# Patient Record
Sex: MALE | Race: WHITE | NOT HISPANIC OR LATINO | Employment: OTHER | URBAN - NONMETROPOLITAN AREA
[De-identification: names, ages, dates, MRNs, and addresses within clinical notes are randomized per-mention and may not be internally consistent; named-entity substitution may affect disease eponyms.]

---

## 2022-09-23 ENCOUNTER — HOSPITAL ENCOUNTER (OUTPATIENT)
Facility: HOSPITAL | Age: 57
Setting detail: OBSERVATION
Discharge: HOME | End: 2022-09-24
Attending: EMERGENCY MEDICINE | Admitting: FAMILY MEDICINE
Payer: COMMERCIAL

## 2022-09-23 DIAGNOSIS — R26.9 GAIT DIFFICULTY: ICD-10-CM

## 2022-09-23 DIAGNOSIS — M54.50 ACUTE RIGHT-SIDED LOW BACK PAIN WITHOUT SCIATICA: Primary | ICD-10-CM

## 2022-09-23 LAB
ALBUMIN SERPL BCP-MCNC: 4 G/DL (ref 3.5–5)
ALP SERPL-CCNC: 41 U/L (ref 46–116)
ALT SERPL W P-5'-P-CCNC: 66 U/L (ref 12–78)
ANION GAP SERPL CALCULATED.3IONS-SCNC: 6 MMOL/L (ref 4–13)
AST SERPL W P-5'-P-CCNC: 32 U/L (ref 5–45)
BASOPHILS # BLD AUTO: 0.01 THOUSANDS/ΜL (ref 0–0.1)
BASOPHILS NFR BLD AUTO: 0 % (ref 0–1)
BILIRUB SERPL-MCNC: 0.81 MG/DL (ref 0.2–1)
BUN SERPL-MCNC: 13 MG/DL (ref 5–25)
CALCIUM SERPL-MCNC: 8.6 MG/DL (ref 8.3–10.1)
CHLORIDE SERPL-SCNC: 103 MMOL/L (ref 96–108)
CO2 SERPL-SCNC: 28 MMOL/L (ref 21–32)
CREAT SERPL-MCNC: 0.81 MG/DL (ref 0.6–1.3)
EOSINOPHIL # BLD AUTO: 0.01 THOUSAND/ΜL (ref 0–0.61)
EOSINOPHIL NFR BLD AUTO: 0 % (ref 0–6)
ERYTHROCYTE [DISTWIDTH] IN BLOOD BY AUTOMATED COUNT: 12.7 % (ref 11.6–15.1)
GFR SERPL CREATININE-BSD FRML MDRD: 98 ML/MIN/1.73SQ M
GLUCOSE SERPL-MCNC: 109 MG/DL (ref 65–140)
HCT VFR BLD AUTO: 45.7 % (ref 36.5–49.3)
HGB BLD-MCNC: 15.9 G/DL (ref 12–17)
IMM GRANULOCYTES # BLD AUTO: 0.09 THOUSAND/UL (ref 0–0.2)
IMM GRANULOCYTES NFR BLD AUTO: 1 % (ref 0–2)
LYMPHOCYTES # BLD AUTO: 0.89 THOUSANDS/ΜL (ref 0.6–4.47)
LYMPHOCYTES NFR BLD AUTO: 7 % (ref 14–44)
MCH RBC QN AUTO: 33.4 PG (ref 26.8–34.3)
MCHC RBC AUTO-ENTMCNC: 34.8 G/DL (ref 31.4–37.4)
MCV RBC AUTO: 96 FL (ref 82–98)
MONOCYTES # BLD AUTO: 0.74 THOUSAND/ΜL (ref 0.17–1.22)
MONOCYTES NFR BLD AUTO: 6 % (ref 4–12)
NEUTROPHILS # BLD AUTO: 10.28 THOUSANDS/ΜL (ref 1.85–7.62)
NEUTS SEG NFR BLD AUTO: 86 % (ref 43–75)
NRBC BLD AUTO-RTO: 0 /100 WBCS
PLATELET # BLD AUTO: 147 THOUSANDS/UL (ref 149–390)
PMV BLD AUTO: 10.2 FL (ref 8.9–12.7)
POTASSIUM SERPL-SCNC: 4 MMOL/L (ref 3.5–5.3)
PROT SERPL-MCNC: 6.7 G/DL (ref 6.4–8.4)
RBC # BLD AUTO: 4.76 MILLION/UL (ref 3.88–5.62)
SODIUM SERPL-SCNC: 137 MMOL/L (ref 135–147)
WBC # BLD AUTO: 12.02 THOUSAND/UL (ref 4.31–10.16)

## 2022-09-23 PROCEDURE — 85025 COMPLETE CBC W/AUTO DIFF WBC: CPT | Performed by: EMERGENCY MEDICINE

## 2022-09-23 PROCEDURE — 80053 COMPREHEN METABOLIC PANEL: CPT | Performed by: EMERGENCY MEDICINE

## 2022-09-23 PROCEDURE — 96375 TX/PRO/DX INJ NEW DRUG ADDON: CPT

## 2022-09-23 PROCEDURE — 36415 COLL VENOUS BLD VENIPUNCTURE: CPT | Performed by: EMERGENCY MEDICINE

## 2022-09-23 PROCEDURE — 99285 EMERGENCY DEPT VISIT HI MDM: CPT

## 2022-09-23 PROCEDURE — 96374 THER/PROPH/DIAG INJ IV PUSH: CPT

## 2022-09-23 PROCEDURE — 99285 EMERGENCY DEPT VISIT HI MDM: CPT | Performed by: EMERGENCY MEDICINE

## 2022-09-23 RX ORDER — LIDOCAINE 50 MG/G
1 PATCH TOPICAL ONCE
Status: COMPLETED | OUTPATIENT
Start: 2022-09-23 | End: 2022-09-24

## 2022-09-23 RX ORDER — MORPHINE SULFATE 10 MG/ML
6 INJECTION, SOLUTION INTRAMUSCULAR; INTRAVENOUS ONCE
Status: COMPLETED | OUTPATIENT
Start: 2022-09-23 | End: 2022-09-23

## 2022-09-23 RX ORDER — METHOCARBAMOL 500 MG/1
1000 TABLET, FILM COATED ORAL ONCE
Status: COMPLETED | OUTPATIENT
Start: 2022-09-23 | End: 2022-09-23

## 2022-09-23 RX ORDER — FENTANYL CITRATE 50 UG/ML
100 INJECTION, SOLUTION INTRAMUSCULAR; INTRAVENOUS ONCE
Status: COMPLETED | OUTPATIENT
Start: 2022-09-23 | End: 2022-09-23

## 2022-09-23 RX ADMIN — MORPHINE SULFATE 6 MG: 10 INJECTION, SOLUTION INTRAMUSCULAR; INTRAVENOUS at 22:28

## 2022-09-23 RX ADMIN — FENTANYL CITRATE 100 MCG: 50 INJECTION INTRAMUSCULAR; INTRAVENOUS at 23:43

## 2022-09-23 RX ADMIN — LIDOCAINE 5% 1 PATCH: 700 PATCH TOPICAL at 22:29

## 2022-09-23 RX ADMIN — METHOCARBAMOL TABLETS 1000 MG: 500 TABLET, COATED ORAL at 22:28

## 2022-09-24 VITALS
DIASTOLIC BLOOD PRESSURE: 79 MMHG | HEART RATE: 58 BPM | OXYGEN SATURATION: 95 % | RESPIRATION RATE: 18 BRPM | TEMPERATURE: 98.4 F | SYSTOLIC BLOOD PRESSURE: 140 MMHG | HEIGHT: 66 IN | BODY MASS INDEX: 27.77 KG/M2 | WEIGHT: 172.8 LBS

## 2022-09-24 PROBLEM — I25.10 CAD (CORONARY ARTERY DISEASE): Status: ACTIVE | Noted: 2022-09-24

## 2022-09-24 LAB
BACTERIA UR QL AUTO: ABNORMAL /HPF
BILIRUB UR QL STRIP: NEGATIVE
CLARITY UR: ABNORMAL
COLOR UR: YELLOW
GLUCOSE UR STRIP-MCNC: NEGATIVE MG/DL
HGB UR QL STRIP.AUTO: ABNORMAL
KETONES UR STRIP-MCNC: ABNORMAL MG/DL
LEUKOCYTE ESTERASE UR QL STRIP: NEGATIVE
NITRITE UR QL STRIP: NEGATIVE
NON-SQ EPI CELLS URNS QL MICRO: ABNORMAL /HPF
PH UR STRIP.AUTO: 6.5 [PH]
PROT UR STRIP-MCNC: NEGATIVE MG/DL
RBC #/AREA URNS AUTO: ABNORMAL /HPF
SP GR UR STRIP.AUTO: 1.02 (ref 1–1.03)
UROBILINOGEN UR QL STRIP.AUTO: 0.2 E.U./DL
WBC #/AREA URNS AUTO: ABNORMAL /HPF

## 2022-09-24 PROCEDURE — 99236 HOSP IP/OBS SAME DATE HI 85: CPT | Performed by: FAMILY MEDICINE

## 2022-09-24 PROCEDURE — 81001 URINALYSIS AUTO W/SCOPE: CPT | Performed by: EMERGENCY MEDICINE

## 2022-09-24 RX ORDER — LISINOPRIL 10 MG/1
10 TABLET ORAL 2 TIMES DAILY
COMMUNITY

## 2022-09-24 RX ORDER — CLOPIDOGREL BISULFATE 75 MG/1
75 TABLET ORAL DAILY
Status: DISCONTINUED | OUTPATIENT
Start: 2022-09-24 | End: 2022-09-24 | Stop reason: HOSPADM

## 2022-09-24 RX ORDER — CLOPIDOGREL BISULFATE 75 MG/1
75 TABLET ORAL DAILY
COMMUNITY

## 2022-09-24 RX ORDER — ROSUVASTATIN CALCIUM 20 MG/1
20 TABLET, COATED ORAL DAILY
COMMUNITY

## 2022-09-24 RX ORDER — ATORVASTATIN CALCIUM 40 MG/1
40 TABLET, FILM COATED ORAL
Status: DISCONTINUED | OUTPATIENT
Start: 2022-09-24 | End: 2022-09-24 | Stop reason: HOSPADM

## 2022-09-24 RX ORDER — METHOCARBAMOL 500 MG/1
500 TABLET, FILM COATED ORAL 3 TIMES DAILY
Qty: 30 TABLET | Refills: 0 | Status: SHIPPED | OUTPATIENT
Start: 2022-09-24 | End: 2022-10-04

## 2022-09-24 RX ORDER — LANOLIN ALCOHOL/MO/W.PET/CERES
3 CREAM (GRAM) TOPICAL
Qty: 30 TABLET | Refills: 0 | Status: SHIPPED | OUTPATIENT
Start: 2022-09-24 | End: 2022-10-07

## 2022-09-24 RX ORDER — LISINOPRIL 10 MG/1
10 TABLET ORAL 2 TIMES DAILY
Status: DISCONTINUED | OUTPATIENT
Start: 2022-09-24 | End: 2022-09-24 | Stop reason: HOSPADM

## 2022-09-24 RX ORDER — PREDNISONE 20 MG/1
40 TABLET ORAL DAILY
Qty: 10 TABLET | Refills: 0 | Status: SHIPPED | OUTPATIENT
Start: 2022-09-25 | End: 2022-09-30

## 2022-09-24 RX ORDER — ACETAMINOPHEN 325 MG/1
650 TABLET ORAL
Qty: 90 TABLET | Refills: 0 | Status: SHIPPED | OUTPATIENT
Start: 2022-09-24 | End: 2022-10-07

## 2022-09-24 RX ORDER — PRAVASTATIN SODIUM 20 MG
20 TABLET ORAL DAILY
COMMUNITY
End: 2022-09-24

## 2022-09-24 RX ORDER — ASPIRIN 81 MG/1
81 TABLET ORAL DAILY
Status: DISCONTINUED | OUTPATIENT
Start: 2022-09-24 | End: 2022-09-24 | Stop reason: HOSPADM

## 2022-09-24 RX ORDER — OXYCODONE HYDROCHLORIDE 5 MG/1
5 TABLET ORAL EVERY 4 HOURS PRN
Status: DISCONTINUED | OUTPATIENT
Start: 2022-09-24 | End: 2022-09-24 | Stop reason: HOSPADM

## 2022-09-24 RX ORDER — ASPIRIN 81 MG/1
81 TABLET ORAL DAILY
COMMUNITY

## 2022-09-24 RX ORDER — PREDNISONE 20 MG/1
40 TABLET ORAL DAILY
Status: DISCONTINUED | OUTPATIENT
Start: 2022-09-24 | End: 2022-09-24 | Stop reason: HOSPADM

## 2022-09-24 RX ORDER — LANOLIN ALCOHOL/MO/W.PET/CERES
3 CREAM (GRAM) TOPICAL
Status: DISCONTINUED | OUTPATIENT
Start: 2022-09-24 | End: 2022-09-24 | Stop reason: HOSPADM

## 2022-09-24 RX ORDER — EZETIMIBE 10 MG/1
10 TABLET ORAL
COMMUNITY

## 2022-09-24 RX ORDER — EZETIMIBE 10 MG/1
10 TABLET ORAL
Status: DISCONTINUED | OUTPATIENT
Start: 2022-09-24 | End: 2022-09-24 | Stop reason: HOSPADM

## 2022-09-24 RX ORDER — PRAVASTATIN SODIUM 20 MG
20 TABLET ORAL DAILY
Status: DISCONTINUED | OUTPATIENT
Start: 2022-09-24 | End: 2022-09-24

## 2022-09-24 RX ORDER — ACETAMINOPHEN 325 MG/1
650 TABLET ORAL EVERY 6 HOURS PRN
Status: DISCONTINUED | OUTPATIENT
Start: 2022-09-24 | End: 2022-09-24 | Stop reason: HOSPADM

## 2022-09-24 RX ORDER — METHOCARBAMOL 500 MG/1
500 TABLET, FILM COATED ORAL EVERY 6 HOURS PRN
Status: DISCONTINUED | OUTPATIENT
Start: 2022-09-24 | End: 2022-09-24 | Stop reason: HOSPADM

## 2022-09-24 RX ADMIN — CLOPIDOGREL BISULFATE 75 MG: 75 TABLET ORAL at 08:55

## 2022-09-24 RX ADMIN — LISINOPRIL 10 MG: 10 TABLET ORAL at 08:55

## 2022-09-24 RX ADMIN — RIVAROXABAN 2.5 MG: 2.5 TABLET, FILM COATED ORAL at 08:55

## 2022-09-24 RX ADMIN — OXYCODONE HYDROCHLORIDE 5 MG: 5 TABLET ORAL at 01:39

## 2022-09-24 RX ADMIN — METHOCARBAMOL TABLETS 500 MG: 500 TABLET, COATED ORAL at 08:55

## 2022-09-24 RX ADMIN — PREDNISONE 40 MG: 20 TABLET ORAL at 10:27

## 2022-09-24 RX ADMIN — ASPIRIN 81 MG: 81 TABLET, COATED ORAL at 08:55

## 2022-09-24 RX ADMIN — ACETAMINOPHEN 650 MG: 325 TABLET ORAL at 08:54

## 2022-09-24 NOTE — ED PROVIDER NOTES
History  Chief Complaint   Patient presents with    Back Pain     Back pain seen this AM at another ER, pain continues, denies other complaints to triage RN  100 mcg of fent and 4mg of zofran by EMS       History provided by:  Medical records and patient  Back Pain  Location:  Lumbar spine  Quality:  Aching and stiffness  Stiffness is present:  All day  Radiates to: Right anterior thigh  Pain severity:  Severe  Pain is: Worse during the night  Onset quality:  Gradual  Duration:  6 weeks  Timing:  Constant  Progression:  Waxing and waning  Chronicity:  Chronic  Context comment:  Patient states he strained his back 6 weeks ago after lifting crab traps, has been seen a chiropractor weekly since, no significant relief, intensified today to the point reabsorbed having trouble walking, seen at [facility] ED, had CT lumbar spine  Relieved by:  NSAIDs and narcotics (States he got some relief with Toradol, however returned shortly after  Given fentanyl by EMS, states he had some relief, however is returning)  Worsened by:  Sitting, standing and ambulation  Ineffective treatments:  Muscle relaxants  Associated symptoms: no abdominal pain, no chest pain, no dysuria, no fever, no headaches and no weakness        Prior to Admission Medications   Prescriptions Last Dose Informant Patient Reported?  Taking?   aspirin (ECOTRIN LOW STRENGTH) 81 mg EC tablet 9/23/2022 at Unknown time Self Yes Yes   Sig: Take 81 mg by mouth daily   clopidogrel (PLAVIX) 75 mg tablet 9/23/2022 at Unknown time Self Yes Yes   Sig: Take 75 mg by mouth daily   ezetimibe (ZETIA) 10 mg tablet 9/23/2022 at Unknown time Self Yes Yes   Sig: Take 10 mg by mouth 2 (two) times a day   lisinopril (ZESTRIL) 10 mg tablet 9/23/2022 at Unknown time Self Yes Yes   Sig: Take 10 mg by mouth daily   pravastatin (PRAVACHOL) 20 mg tablet 9/23/2022 at Unknown time  Yes Yes   Sig: Take 20 mg by mouth daily      Facility-Administered Medications: None       Past Medical History:   Diagnosis Date    Hyperlipidemia     Hypertension     Myocardial infarct [facility])        Past Surgical History:   Procedure Laterality Date    CARDIAC SURGERY      5 way heart bypass 2016       History reviewed  No pertinent family history  I have reviewed and agree with the history as documented  E-Cigarette/Vaping     E-Cigarette/Vaping Substances     Social History     Tobacco Use    Smoking status: Never Smoker    Smokeless tobacco: Never Used   Substance Use Topics    Alcohol use: Not Currently    Drug use: Not Currently       Review of Systems   Constitutional: Negative for appetite change, chills, fatigue and fever  HENT: Negative for ear pain, rhinorrhea, sore throat and trouble swallowing  Eyes: Negative for pain, discharge and visual disturbance  Respiratory: Negative for cough, chest tightness and shortness of breath  Cardiovascular: Negative for chest pain and palpitations  Gastrointestinal: Negative for abdominal pain, nausea and vomiting  Endocrine: Negative for polydipsia, polyphagia and polyuria  Genitourinary: Negative for difficulty urinating, dysuria, hematuria and testicular pain  Musculoskeletal: Positive for back pain  Negative for arthralgias  Skin: Negative for color change and rash  Allergic/Immunologic: Negative for immunocompromised state  Neurological: Negative for dizziness, seizures, syncope, weakness and headaches  Hematological: Negative for adenopathy  Psychiatric/Behavioral: Negative for confusion and dysphoric mood  All other systems reviewed and are negative  Physical Exam  Physical Exam  Vitals and nursing note reviewed  Constitutional:       General: He is not in acute distress  Appearance: Normal appearance  He is not ill-appearing, toxic-appearing or diaphoretic  HENT:      Head: Normocephalic and atraumatic  Nose: Nose normal  No congestion or rhinorrhea        Mouth/Throat:      Mouth: Mucous membranes are moist       Pharynx: Oropharynx is clear  No oropharyngeal exudate or posterior oropharyngeal erythema  Eyes:      General:         Right eye: No discharge  Left eye: No discharge  Cardiovascular:      Rate and Rhythm: Normal rate and regular rhythm  Pulses: Normal pulses  Heart sounds: Normal heart sounds  No murmur heard  No gallop  Pulmonary:      Effort: Pulmonary effort is normal  No respiratory distress  Breath sounds: Normal breath sounds  No stridor  No wheezing, rhonchi or rales  Chest:      Chest wall: No tenderness  Abdominal:      General: Bowel sounds are normal  There is no distension  Palpations: Abdomen is soft  There is no mass  Tenderness: There is no abdominal tenderness  There is no right CVA tenderness, left CVA tenderness, guarding or rebound  Hernia: No hernia is present  Musculoskeletal:         General: No swelling, tenderness, deformity or signs of injury  Cervical back: Normal range of motion and neck supple  Right lower leg: No edema  Left lower leg: No edema  Comments: Stiffness, bilateral paraspinal spasms lumbar region, reproduction of pain coming to a sitting position and with right straight leg raise, however no radiation past knee   Skin:     General: Skin is warm and dry  Capillary Refill: Capillary refill takes less than 2 seconds  Neurological:      General: No focal deficit present  Mental Status: He is alert and oriented to person, place, and time  Cranial Nerves: No cranial nerve deficit  Sensory: No sensory deficit  Motor: No weakness  Coordination: Coordination normal       Gait: Gait normal       Deep Tendon Reflexes: Reflexes normal    Psychiatric:         Mood and Affect: Mood normal          Behavior: Behavior normal          Thought Content:  Thought content normal          Judgment: Judgment normal          Vital Signs  ED Triage Vitals   Temperature Pulse Respirations Blood Pressure SpO2   09/23/22 2151 09/23/22 2151 09/23/22 2151 09/23/22 2151 09/23/22 2151   98 2 °F (36 8 °C) 59 18 155/94 97 %      Temp Source Heart Rate Source Patient Position - Orthostatic VS BP Location FiO2 (%)   09/23/22 2151 09/23/22 2230 -- -- --   Temporal Monitor         Pain Score       09/23/22 2228       10 - Worst Possible Pain           Vitals:    09/23/22 2151 09/23/22 2230 09/23/22 2300   BP: 155/94 147/93 138/90   Pulse: 59 67 (!) 54         Visual Acuity      ED Medications  Medications   lidocaine (LIDODERM) 5 % patch 1 patch (1 patch Topical Medication Applied 9/23/22 2229)   morphine injection 6 mg (6 mg Intravenous Given 9/23/22 2228)   methocarbamol (ROBAXIN) tablet 1,000 mg (1,000 mg Oral Given 9/23/22 2228)   fentanyl citrate (PF) 100 MCG/2ML 100 mcg (100 mcg Intravenous Given 9/23/22 2343)       Diagnostic Studies  Results Reviewed     Procedure Component Value Units Date/Time    UA (URINE) with reflex to Scope [[id number]]  (Abnormal) Collected: 09/24/22 0036    Lab Status: Final result Specimen: Urine, Clean Catch Updated: 09/24/22 0042     Color, UA Yellow     Clarity, UA Slightly Cloudy     Specific [place], UA 1 020     pH, UA 6 5     Leukocytes, UA Negative     Nitrite, UA Negative     Protein, UA Negative mg/dl      Glucose, UA Negative mg/dl      Ketones, UA 15 (1+) mg/dl      Urobilinogen, UA 0 2 E U /dl      Bilirubin, UA Negative     Occult Blood, UA Moderate    Comprehensive metabolic panel [[id number]]  (Abnormal) Collected: 09/23/22 2223    Lab Status: Final result Specimen: Blood from Arm, Left Updated: 09/23/22 2245     Sodium 137 mmol/L      Potassium 4 0 mmol/L      Chloride 103 mmol/L      CO2 28 mmol/L      ANION GAP 6 mmol/L      BUN 13 mg/dL      Creatinine 0 81 mg/dL      Glucose 109 mg/dL      Calcium 8 6 mg/dL      AST 32 U/L      ALT 66 U/L      Alkaline Phosphatase 41 U/L      Total Protein 6 7 g/dL      Albumin 4 0 g/dL      Total Bilirubin 0 81 mg/dL      eGFR 98 ml/min/1 73sq m     Narrative:      [place] guidelines for Chronic Kidney Disease (CKD):     Stage 1 with normal or high GFR (GFR > 90 mL/min/1 73 square meters)    Stage 2 Mild CKD (GFR = 60-89 mL/min/1 73 square meters)    Stage 3A Moderate CKD (GFR = 45-59 mL/min/1 73 square meters)    Stage 3B Moderate CKD (GFR = 30-44 mL/min/1 73 square meters)    Stage 4 Severe CKD (GFR = 15-29 mL/min/1 73 square meters)    Stage 5 End Stage CKD (GFR <15 mL/min/1 73 square meters)  Note: GFR calculation is accurate only with a steady state creatinine    CBC and differential [[id number]]  (Abnormal) Collected: 09/23/22 2223    Lab Status: Final result Specimen: Blood from Arm, Left Updated: 09/23/22 2229     WBC 12 02 Thousand/uL      RBC 4 76 Million/uL      Hemoglobin 15 9 g/dL      Hematocrit 45 7 %      MCV 96 fL      MCH 33 4 pg      MCHC 34 8 g/dL      RDW 12 7 %      MPV 10 2 fL      Platelets 025 Thousands/uL      nRBC 0 /100 WBCs      Neutrophils Relative 86 %      Immat GRANS % 1 %      Lymphocytes Relative 7 %      Monocytes Relative 6 %      Eosinophils Relative 0 %      Basophils Relative 0 %      Neutrophils Absolute 10 28 Thousands/µL      Immature Grans Absolute 0 09 Thousand/uL      Lymphocytes Absolute 0 89 Thousands/µL      Monocytes Absolute 0 74 Thousand/µL      Eosinophils Absolute 0 01 Thousand/µL      Basophils Absolute 0 01 Thousands/µL                  No orders to display              Procedures  Procedures         ED Course  ED Course as of 09/24/22 0057   Fri Sep 23, 2022   2146 2146:  Patient appears uncomfortable, vital signs reviewed  Concerns for acute lumbago  Normal neurological exam distally  Reviewed outside imaging done today, CT lumbar spine, no acute abnormality noted  No evidence to suggest renal colic or spinal canal space occupying lesion    Benign abdominal exam   Plan to give analgesics, muscle relaxers, lidocaine patch, re-evaluate  2332 2332:  Labs reviewed  Pain still present and limiting patient's ability to ambulate  Discussed with medicine for observational admission  SBIRT 22yo+    Flowsheet Row Most Recent Value   SBIRT (23 yo +)    In order to provide better care to our patients, we are screening all of our patients for alcohol and drug use  Would it be okay to ask you these screening questions? Yes Filed at: 09/23/2022 2222   Initial Alcohol Screen: US AUDIT-C     1  How often do you have a drink containing alcohol? 3 Filed at: 09/23/2022 2222   2  How many drinks containing alcohol do you have on a typical day you are drinking? 2 Filed at: 09/23/2022 2222   3a  Male UNDER 65: How often do you have five or more drinks on one occasion? 0 Filed at: 09/23/2022 2222   Audit-C Score 5 Filed at: 09/23/2022 2222   ALICIA: How many times in the past year have you    Used an illegal drug or used a prescription medication for non-medical reasons?  Never Filed at: 09/23/2022 2222                    MDM    Disposition  Final diagnoses:   Acute right-sided low back pain without sciatica   Gait difficulty     Time reflects when diagnosis was documented in both MDM as applicable and the Disposition within this note     Time User Action Codes Description Comment    9/23/2022 10:15 PM Carline Moreau [M54 50] Acute right-sided low back pain without sciatica     9/23/2022 10:15 PM Sandleslia Stage Add [R26 9] Gait difficulty       ED Disposition     ED Disposition   Admit    Condition   Stable    Date/Time   Fri Sep 23, 2022 10:15 PM    Comment              Follow-up Information    None         Current Discharge Medication List      CONTINUE these medications which have NOT CHANGED    Details   aspirin (ECOTRIN LOW STRENGTH) 81 mg EC tablet Take 81 mg by mouth daily      clopidogrel (PLAVIX) 75 mg tablet Take 75 mg by mouth daily      ezetimibe (ZETIA) 10 mg tablet Take 10 mg by mouth 2 (two) times a day      lisinopril (ZESTRIL) 10 mg tablet Take 10 mg by mouth daily      pravastatin (PRAVACHOL) 20 mg tablet Take 20 mg by mouth daily             No discharge procedures on file      PDMP Review     None          ED Provider  Electronically Signed by           Kina Sebastian MD  09/24/22 9531

## 2022-09-24 NOTE — DISCHARGE SUMMARY
[address]  Discharge- Lazara Frazier 1965, 62 y o  male MRN: [id number]  Unit/Bed#: -[facility]go Encounter: [id number]  Primary Care Provider: Maria L Escamilla DO   Date and time admitted to hospital: 9/23/2022  9:46 PM    * Intractable back pain  Assessment & Plan  · Presents from home with worsening back pain , ambulatory dysfunction  Injured back several weeks ago, felt worsened today after bending to put socks on  · Presented to [facility] initially had CT a/p without contrast showing no acute findings  CT spine without fracture or malalignment  · Denies Loss of Bowel or bladder, no focal neurological deficit - no indication for MRI at this time  · Likely muscular sprain   · PRN pain control: will start with robaxin, lidocaine patch, Toradol   · PT/OT eval     Leukocytosis  Assessment & Plan  · WBC 12 on admission  · Suspect from steroid injection   · Does not meet sepsis criteria, afebrile  · neg UA    Ambulatory dysfunction  Assessment & Plan  · Secondary to intractable back pain  Unable to ambulate in the ED  · Consult PT/OT for evaluation     Essential hypertension  Assessment & Plan  · Blood pressure controlled  Continue home medications    CAD (coronary artery disease)  Assessment & Plan  · HX STEMI with angioplasty and residual occlusion per outpatient cardiology notes, has been on aggressive medical management   · Continue Xarelto, ASA, Plavix, statin      Discharging Physician / Practitioner: Brooke Burton MD  PCP: Maria L Escamilla DO  Admission Date:   Admission Orders (From admission, onward)     Ordered        09/23/22 2351  Place in Observation  Once                      Discharge Date: 09/24/22    Medical Problems             Resolved Problems  Date Reviewed: 9/24/2022   None                 Consultations During Hospital Stay:  · none    Procedures Performed:   · none    Significant Findings / Test Results:   No results found      Incidental Findings: · none    Test Results Pending at Discharge (will require follow up):   · none     Outpatient Tests Requested:  · Outpatient follow-up with pain management and physical therapy    Complications:  none    Reason for Admission:  Intractable back pain    Hospital Course:     Princess Oscar is a 62 y o  male patient who originally presented to the hospital on 9/23/2022 due to intractable back pain  Patient states that he injured his back on a fishing trip a few weeks ago and since then he has been having progressive back pain and also seeing chiropractor outpatient with no relief  Felt much worse yesterday and unable to bend to put his socks on  Complaining of some constipation as well with no urinary incontinence  Currently clinically improving recommended outpatient physical therapy and aquatherapy along with continuing chiropractic eval   Will place on a 5 day course of prednisone along with Robaxin and Tylenol at home and outpatient follow-up with pain management if symptoms do not improve in 2 weeks    Please see above list of diagnoses and related plan for additional information  Condition at Discharge: good     Discharge Day Visit / Exam:     Subjective:  Patient states his back pain is down to 3 x 10  He is able to ambulate in the room however does have difficulty bending forwards  Denies any bowel or urinary incontinence  Vitals: Blood Pressure: 140/79 (09/24/22 0734)  Pulse: 58 (09/24/22 0734)  Temperature: 98 4 °F (36 9 °C) (09/24/22 0734)  Temp Source: Temporal (09/24/22 0102)  Respirations: 18 (09/24/22 0734)  Height: 5' 6" (167 6 cm) (09/23/22 2151)  Weight - Scale: 78 4 kg (172 lb 12 8 oz) (09/24/22 0120)  SpO2: 95 % (09/24/22 0734)  Exam:   Physical Exam  Vitals and nursing note reviewed  Constitutional:       Appearance: Normal appearance  HENT:      Head: Normocephalic and atraumatic        Right Ear: External ear normal       Left Ear: External ear normal       Nose: Nose normal  Mouth/Throat:      Pharynx: Oropharynx is clear  Cardiovascular:      Rate and Rhythm: Normal rate and regular rhythm  Heart sounds: Normal heart sounds  Pulmonary:      Effort: Pulmonary effort is normal       Breath sounds: Normal breath sounds  Abdominal:      General: Bowel sounds are normal       Palpations: Abdomen is soft  Tenderness: There is no abdominal tenderness  Musculoskeletal:         General: Normal range of motion  Cervical back: Normal range of motion and neck supple  Comments: Tenderness on palpation of the lower back   Skin:     General: Skin is warm and dry  Capillary Refill: Capillary refill takes less than 2 seconds  Neurological:      General: No focal deficit present  Mental Status: He is alert and oriented to person, place, and time  Psychiatric:         Mood and Affect: Mood normal          Discussion with Family:  Discussed with wife    Discharge instructions/Information to patient and family:   See after visit summary for information provided to patient and family  Provisions for Follow-Up Care:  See after visit summary for information related to follow-up care and any pertinent home health orders  Disposition:     Home    For Discharges to [facility] SNF:   · Not Applicable to this Patient - Not Applicable to this Patient    Planned Readmission: none     Discharge Statement:  I spent 35 minutes discharging the patient  This time was spent on the day of discharge  I had direct contact with the patient on the day of discharge  Greater than 50% of the total time was spent examining patient, answering all patient questions, arranging and discussing plan of care with patient as well as directly providing post-discharge instructions  Additional time then spent on discharge activities  Discharge Medications:  See after visit summary for reconciled discharge medications provided to patient and family        ** Please Note: This note has been constructed using a voice recognition system **

## 2022-09-24 NOTE — ASSESSMENT & PLAN NOTE
· HX STEMI with angioplasty and residual occlusion per outpatient cardiology notes, has been on aggressive medical management   · Continue Xarelto, ASA, Plavix, statin Walking

## 2022-09-24 NOTE — ASSESSMENT & PLAN NOTE
· Presents from home with worsening back pain , ambulatory dysfunction  Injured back several weeks ago, felt worsened today after bending to put socks on  · Presented to [facility] initially had CT a/p without contrast showing no acute findings  CT spine without fracture or malalignment     · Denies Loss of Bowel or bladder, no focal neurological deficit - no indication for MRI at this time  · Likely muscular sprain   · PRN pain control: will start with robaxin, lidocaine patch, Toradol   · PT/OT eval

## 2022-09-24 NOTE — ASSESSMENT & PLAN NOTE
· Secondary to intractable back pain  Unable to ambulate in the ED     · Consult PT/OT for evaluation

## 2022-09-24 NOTE — ASSESSMENT & PLAN NOTE
· Presents from home with worsening back pain , ambulatory dysfunction  Injured back several weeks ago, felt worsened today after bending to put socks on  · Presented to [facility] initially had CT a/p without contrast showing no acute findings  CT spine without fracture or malalignment     · Denies Loss of Bowel or bladder, no focal neurological deficit - no indication for MRI at this time  · Likely muscular sprain   · PRN pain control: will start with robaxin, lidocaine patch, Toradol   · PT/OT eval

## 2022-09-24 NOTE — ASSESSMENT & PLAN NOTE
· HX STEMI with angioplasty and residual occlusion per outpatient cardiology notes, has been on aggressive medical management   · Continue Xarelto, ASA, Plavix, statin

## 2022-09-24 NOTE — DISCHARGE INSTR - AVS FIRST PAGE
Please use lidocaine cream on lower back before going to bed at night  Please take stool softener including Colace 100 mg twice daily in case of constipation    If no bowel movement in 48 hours please take MiraLax  Avoid any exertional activity or lifting any heavy weights or pushing anything heavy for the next 1 month until back pain resolves  Please follow up outpatient with physical therapy along with chiropractor and if pain is not better than follow-up with pain management in 2 weeks

## 2022-09-24 NOTE — ED NOTES
When patient asked what medications he takes pt responded, "I don't know" when responded what pharmacy patient responded, "[facility]"        Winter Sultana RN  09/23/22 8002

## 2022-09-24 NOTE — ASSESSMENT & PLAN NOTE
----- Message from Jonathan Alba MD sent at 7/22/2020  8:26 AM CDT -----  CORIN Garcia.   · Blood pressure controlled    Continue home medications

## 2022-09-24 NOTE — H&P
[address] Joan Ceferino  H&P- Havery Dies 1965, 62 y o  male MRN: [id number]  Unit/Bed#: -01 Encounter: [id number]  Primary Care Provider: Gabe Lee DO   Date and time admitted to hospital: 9/23/2022  9:46 PM    * Intractable back pain  Assessment & Plan  · Presents from home with worsening back pain , ambulatory dysfunction  Injured back several weeks ago, felt worsened today after bending to put socks on  · Presented to [facility] initially had CT a/p without contrast showing no acute findings  CT spine without fracture or malalignment  · Denies Loss of Bowel or bladder, no focal neurological deficit - no indication for MRI at this time  · Likely muscular sprain   · PRN pain control: will start with robaxin, lidocaine patch, Toradol   · PT/OT eval     Ambulatory dysfunction  Assessment & Plan  · Secondary to intractable back pain  Unable to ambulate in the ED  · Consult PT/OT for evaluation     Leukocytosis  Assessment & Plan  · WBC 12 on admission  · Suspect from steroid injection   · Does not meet sepsis criteria, afebrile  · Check UA  · Trend CBC    Hypertension  Assessment & Plan  · Continue home medications    CAD (coronary artery disease)  Assessment & Plan  · HX STEMI with angioplasty and residual occlusion per outpatient cardiology notes, has been on aggressive medical management   · Continue Xarelto, ASA, Plavix, statin    VTE Pharmacologic Prophylaxis: VTE Score: 2 Moderate Risk (Score 3-4) - Pharmacological DVT Prophylaxis Ordered: rivaroxaban (Xarelto)  Code Status:full code   Discussion with family: Patient declined call to   Anticipated Length of Stay: Patient will be admitted on an observation basis with an anticipated length of stay of less than 2 midnights secondary to back pain, amb dysfunction      Total Time for Visit, including Counseling / Coordination of Care: 70 minutes Greater than 50% of this total time spent on direct patient counseling and coordination of care  Chief Complaint: back pain    History of Present Illness:  Froilan Pena is a 62 y o  male with a PMH of CAD , HTN, HLD who presents with worsening back pain and ambulatory dysfunction  Reports he injured back a few weeks ago  Has been seeing a chiropractor weekly with some relief  [place] today that he re aggravated his back when he went to bend down to put his socks on  Had severe spasms, initially went to [facility]  and reports he was given steroid injection   Had difficulty sitting in car on the ride home and later unable to get off toilet at home and called EMS   Denies loss of B/B, no numbness or tingling in extremities  Denies fevers, chills, chest pain , SOB, GI complaints  Review of Systems:  Review of Systems   Constitutional: Positive for activity change  Negative for chills, fatigue and fever  Respiratory: Negative for cough and shortness of breath  Gastrointestinal: Negative for abdominal pain, diarrhea, nausea and vomiting  Genitourinary: Negative for difficulty urinating  Musculoskeletal: Positive for back pain and gait problem  Neurological: Negative for dizziness, weakness, light-headedness, numbness and headaches  All other systems reviewed and are negative  Past Medical and Surgical History:   Past Medical History:   Diagnosis Date    Hyperlipidemia     Hypertension     Myocardial infarct [facility])        Past Surgical History:   Procedure Laterality Date    CARDIAC SURGERY      5 way heart bypass 2016       Meds/Allergies:  Prior to Admission medications    Not on File     I have reviewed home medications with patient personally      Allergies: No Known Allergies    Social History:  Marital Status: /Civil Union     Patient Pre-hospital Living Situation: Home  Patient Pre-hospital Level of Mobility: walks  Patient Pre-hospital Diet Restrictions: none  Substance Use History:   Social History     Substance and Sexual Activity   Alcohol Use Not Currently     Social History     Tobacco Use   Smoking Status Never Smoker   Smokeless Tobacco Never Used     Social History     Substance and Sexual Activity   Drug Use Not Currently       Family History:  History reviewed  No pertinent family history  Physical Exam:     Vitals:   Blood Pressure: 142/78 (09/24/22 0120)  Pulse: 64 (09/24/22 0120)  Temperature: 98 1 °F (36 7 °C) (09/24/22 0120)  Temp Source: Temporal (09/24/22 0102)  Respirations: 20 (09/24/22 0120)  Height: 5' 6" (167 6 cm) (09/23/22 2151)  Weight - Scale: 77 1 kg (169 lb 15 6 oz) (09/23/22 2151)  SpO2: 95 % (09/24/22 0120)    Physical Exam  Vitals and nursing note reviewed  Constitutional:       General: He is not in acute distress  Appearance: He is not toxic-appearing or diaphoretic  Cardiovascular:      Pulses: Normal pulses  Pulmonary:      Effort: Pulmonary effort is normal  No respiratory distress  Breath sounds: Normal breath sounds  No wheezing, rhonchi or rales  Abdominal:      General: Bowel sounds are normal  There is no distension  Palpations: Abdomen is soft  Tenderness: There is no abdominal tenderness  There is no guarding  Musculoskeletal:         General: No signs of injury  Normal range of motion  Right lower leg: No edema  Left lower leg: No edema  Comments: Able to lift legs off bed, rom intact  Skin:     General: Skin is warm and dry  Neurological:      General: No focal deficit present  Mental Status: He is alert and oriented to person, place, and time            Additional Data:     Lab Results:  Results from last 7 days   Lab Units 09/23/22  2223   WBC Thousand/uL 12 02*   HEMOGLOBIN g/dL 15 9   HEMATOCRIT % 45 7   PLATELETS Thousands/uL 147*   NEUTROS PCT % 86*   LYMPHS PCT % 7*   MONOS PCT % 6   EOS PCT % 0     Results from last 7 days   Lab Units 09/23/22  2223   SODIUM mmol/L 137   POTASSIUM mmol/L 4 0   CHLORIDE mmol/L 103   CO2 mmol/L 28   BUN mg/dL 13 CREATININE mg/dL 0 81   ANION GAP mmol/L 6   CALCIUM mg/dL 8 6   ALBUMIN g/dL 4 0   TOTAL BILIRUBIN mg/dL 0 81   ALK PHOS U/L 41*   ALT U/L 66   AST U/L 32   GLUCOSE RANDOM mg/dL 109                       Imaging: No pertinent imaging reviewed  reviewed CT from outside hospital   No orders to display       EKG and Other Studies Reviewed on Admission:   · EKG: No EKG obtained  ** Please Note: This note has been constructed using a voice recognition system   **

## 2022-09-24 NOTE — PLAN OF CARE
Problem: Potential for Falls  Goal: Patient will remain free of falls  Description: INTERVENTIONS:  - Educate patient/family on patient safety including physical limitations  - Instruct patient to call for assistance with activity   - Consult OT/PT to assist with strengthening/mobility   - Keep Call bell within reach  - Keep bed low and locked with side rails adjusted as appropriate  - Keep care items and personal belongings within reach  - Initiate and maintain comfort rounds  - Make Fall Risk Sign visible to staff  - Offer Toileting every  Hours, in advance of need  - Initiate/Maintain alarm  - Obtain necessary fall risk management equipment:   - Apply yellow socks and bracelet for high fall risk patients  - Consider moving patient to room near nurses station  Outcome: Progressing     Problem: PAIN - ADULT  Goal: Verbalizes/displays adequate comfort level or baseline comfort level  Description: Interventions:  - Encourage patient to monitor pain and request assistance  - Assess pain using appropriate pain scale  - Administer analgesics based on type and severity of pain and evaluate response  - Implement non-pharmacological measures as appropriate and evaluate response  - Consider cultural and social influences on pain and pain management  - Notify physician/advanced practitioner if interventions unsuccessful or patient reports new pain  Outcome: Progressing     Problem: INFECTION - ADULT  Goal: Absence or prevention of progression during hospitalization  Description: INTERVENTIONS:  - Assess and monitor for signs and symptoms of infection  - Monitor lab/diagnostic results  - Monitor all insertion sites, i e  indwelling lines, tubes, and drains  - Monitor endotracheal if appropriate and nasal secretions for changes in amount and color  - [place] appropriate cooling/warming therapies per order  - Administer medications as ordered  - Instruct and encourage patient and family to use good hand hygiene technique  - Identify and instruct in appropriate isolation precautions for identified infection/condition  Outcome: Progressing  Goal: Absence of fever/infection during neutropenic period  Description: INTERVENTIONS:  - Monitor WBC    Outcome: Progressing     Problem: SAFETY ADULT  Goal: Patient will remain free of falls  Description: INTERVENTIONS:  - Educate patient/family on patient safety including physical limitations  - Instruct patient to call for assistance with activity   - Consult OT/PT to assist with strengthening/mobility   - Keep Call bell within reach  - Keep bed low and locked with side rails adjusted as appropriate  - Keep care items and personal belongings within reach  - Initiate and maintain comfort rounds  - Make Fall Risk Sign visible to staff  - Offer Toileting every  Hours, in advance of need  - Initiate/Maintain alarm  - Obtain necessary fall risk management equipment:   - Apply yellow socks and bracelet for high fall risk patients  - Consider moving patient to room near nurses station  Outcome: Progressing  Goal: Maintain or return to baseline ADL function  Description: INTERVENTIONS:  -  Assess patient's ability to carry out ADLs; assess patient's baseline for ADL function and identify physical deficits which impact ability to perform ADLs (bathing, care of mouth/teeth, toileting, grooming, dressing, etc )  - Assess/evaluate cause of self-care deficits   - Assess range of motion  - Assess patient's mobility; develop plan if impaired  - Assess patient's need for assistive devices and provide as appropriate  - Encourage maximum independence but intervene and supervise when necessary  - Involve family in performance of ADLs  - Assess for home care needs following discharge   - Consider OT consult to assist with ADL evaluation and planning for discharge  - Provide patient education as appropriate  Outcome: Progressing  Goal: Maintains/Returns to pre admission functional level  Description: INTERVENTIONS:  - Perform BMAT or MOVE assessment daily    - Set and communicate daily mobility goal to care team and patient/family/caregiver  - Collaborate with rehabilitation services on mobility goals if consulted  - Perform Range of Motion  times a day  - Reposition patient every  hours  - Dangle patient  times a day  - Stand patient  times a day  - Ambulate patient  times a day  - Out of bed to chair  times a day   - Out of bed for meals  times a day  - Out of bed for toileting  - Record patient progress and toleration of activity level   Outcome: Progressing     Problem: DISCHARGE PLANNING  Goal: Discharge to home or other facility with appropriate resources  Description: INTERVENTIONS:  - Identify barriers to discharge w/patient and caregiver  - Arrange for needed discharge resources and transportation as appropriate  - Identify discharge learning needs (meds, wound care, etc )  - Arrange for interpretive services to assist at discharge as needed  - Refer to Case Management Department for coordinating discharge planning if the patient needs post-hospital services based on physician/advanced practitioner order or complex needs related to functional status, cognitive ability, or social support system  Outcome: Progressing     Problem: Knowledge Deficit  Goal: Patient/family/caregiver demonstrates understanding of disease process, treatment plan, medications, and discharge instructions  Description: Complete learning assessment and assess knowledge base    Interventions:  - Provide teaching at level of understanding  - Provide teaching via preferred learning methods  Outcome: Progressing

## 2022-09-24 NOTE — UTILIZATION REVIEW
Initial Clinical Review    Admission: Date/Time/Statement:   Admission Orders (From admission, onward)     Ordered        09/23/22 2351  Place in Observation  Once                      Orders Placed This Encounter   Procedures    Place in Observation     Standing Status:   Standing     Number of Occurrences:   1     Order Specific Question:   Level of Care     Answer:   Med Surg [16]     ED Arrival Information     Expected   -    Arrival   9/23/2022 21:46    Acuity   Less Urgent            Means of arrival   Ambulance    Escorted by   [facility]    Service   Hospitalist    Admission type   Urgent            Arrival complaint   back pain             Chief Complaint   Patient presents with    Back Pain     Back pain seen this AM at another ER, pain continues, denies other complaints to triage RN  100 mcg of fent and 4mg of zofran by EMS       Initial Presentation: 62 y o  male to ED by ems admitted observation d/t back pain with ambulatory dysfunction  * Intractable back pain  Assessment & Plan  · Presents from home with worsening back pain , ambulatory dysfunction  Injured back several weeks ago, felt worsened today after bending to put socks on  · Presented to [facility] initially had CT a/p without contrast showing no acute findings  CT spine without fracture or malalignment  · Denies Loss of Bowel or bladder, no focal neurological deficit - no indication for MRI at this time  · Likely muscular sprain   · PRN pain control: will start with robaxin, lidocaine patch, Toradol   · PT/OT eval      Ambulatory dysfunction  Assessment & Plan  · Secondary to intractable back pain  Unable to ambulate in the ED     · Consult PT/OT for evaluation      Leukocytosis  Assessment & Plan  · WBC 12 on admission  · Suspect from steroid injection   · Does not meet sepsis criteria, afebrile  · Check UA  · Trend CBC     Hypertension  Assessment & Plan  · Continue home medications     CAD (coronary artery disease)  Assessment & Plan  · HX STEMI with angioplasty and residual occlusion per outpatient cardiology notes, has been on aggressive medical management   · Continue Xarelto, ASA, Plavix, statin      ED Triage Vitals   Temperature Pulse Respirations Blood Pressure SpO2   09/23/22 2151 09/23/22 2151 09/23/22 2151 09/23/22 2151 09/23/22 2151   98 2 °F (36 8 °C) 59 18 155/94 97 %      Temp Source Heart Rate Source Patient Position - Orthostatic VS BP Location FiO2 (%)   09/23/22 2151 09/23/22 2230 09/24/22 0102 09/24/22 0102 --   Temporal Monitor Sitting Right arm       Pain Score       09/23/22 2228       10 - Worst Possible Pain          Wt Readings from Last 1 Encounters:   09/24/22 78 4 kg (172 lb 12 8 oz)     Additional Vital Signs:   09/24/22 07:34:21 98 4 °F (36 9 °C) 58 18 140/79 99 95 % -- --   09/24/22 01:20:48 98 1 °F (36 7 °C) 64 20 142/78 99 95 % -- --   09/24/22 0102 98 1 °F (36 7 °C) 54 Abnormal  18 138/90 98 98 % None (Room air) Sitting   09/23/22 2230 -- 67 -- 147/93 113 95 % -- --   09/23/22 2151 98 2 °F (36 8 °C) 59 18 155/94 -- 97 % -- --       Pertinent Labs/Diagnostic Test Results:   9/23 xray lumbar spine:pending    No orders to display         Results from last 7 days   Lab Units 09/23/22  2223   WBC Thousand/uL 12 02*   HEMOGLOBIN g/dL 15 9   HEMATOCRIT % 45 7   PLATELETS Thousands/uL 147*   NEUTROS ABS Thousands/µL 10 28*         Results from last 7 days   Lab Units 09/23/22  2223   SODIUM mmol/L 137   POTASSIUM mmol/L 4 0   CHLORIDE mmol/L 103   CO2 mmol/L 28   ANION GAP mmol/L 6   BUN mg/dL 13   CREATININE mg/dL 0 81   EGFR ml/min/1 73sq m 98   CALCIUM mg/dL 8 6     Results from last 7 days   Lab Units 09/23/22  2223   AST U/L 32   ALT U/L 66   ALK PHOS U/L 41*   TOTAL PROTEIN g/dL 6 7   ALBUMIN g/dL 4 0   TOTAL BILIRUBIN mg/dL 0 81         Results from last 7 days   Lab Units 09/23/22  2223   GLUCOSE RANDOM mg/dL 109       Results from last 7 days   Lab Units 09/24/22  0036 CLARITY UA  Slightly Cloudy   COLOR UA  Yellow   SPEC GRAV UA  1 020   PH UA  6 5   GLUCOSE UA mg/dl Negative   KETONES UA mg/dl 15 (1+)*   BLOOD UA  Moderate*   PROTEIN UA mg/dl Negative   NITRITE UA  Negative   BILIRUBIN UA  Negative   UROBILINOGEN UA E U /dl 0 2   LEUKOCYTES UA  Negative   WBC UA /hpf 0-1   RBC UA /hpf 4-10*   BACTERIA UA /hpf Occasional   EPITHELIAL CELLS WET PREP /hpf Occasional       ED Treatment:   Medication Administration from 09/23/2022 2145 to 09/24/2022 0031       Date/Time Order Dose Route Action Action by Comments     09/23/2022 2228 morphine injection 6 mg 6 mg Intravenous Given       09/23/2022 2228 methocarbamol (ROBAXIN) tablet 1,000 mg 1,000 mg Oral Given       09/23/2022 2229 lidocaine (LIDODERM) 5 % patch 1 patch 1 patch Topical Medication Applied       09/23/2022 2343 fentanyl citrate (PF) 100 MCG/2ML 100 mcg 100 mcg Intravenous Given          Past Medical History:   Diagnosis Date    Hyperlipidemia     Hypertension     Myocardial infarct ([facility] [address] )      Present on Admission:  **None**      Admitting Diagnosis: Back pain [M54 9]  Gait difficulty [R26 9]  Acute right-sided low back pain without sciatica [M54 50]  Age/Sex: 62 y o  male  Admission Orders:  Scheduled Medications:  aspirin, 81 mg, Oral, Daily  atorvastatin, 40 mg, Oral, Daily With Dinner  clopidogrel, 75 mg, Oral, Daily  ezetimibe, 10 mg, Oral, After Dinner  lidocaine, 1 patch, Topical, Once  lisinopril, 10 mg, Oral, BID  melatonin, 3 mg, Oral, HS  predniSONE, 40 mg, Oral, Daily  rivaroxaban, 2 5 mg, Oral, BID      PRN Meds:  acetaminophen, 650 mg, Oral, Q6H PRN9/24 X 1  methocarbamol, 500 mg, Oral, Q6H PRN 9/24 X 1  morphine injection, 2 mg, Intravenous, Q4H PRN  oxyCODONE, 5 mg, Oral, Q4H PRN 9/24 X 1      PT/OT  AMBULATE  CARDIAC DIET  OOB      Network Utilization Review Department  ATTENTION: Please call with any questions or concerns to [phone] and carefully listen to the prompts so that you are directed to the right person  All voicemails are confidential   [facility] all requests for admission clinical reviews, approved or denied determinations and any other requests to dedicated fax number below belonging to the campus where the patient is receiving treatment   List of dedicated fax numbers for the Facilities:  [address] DENIALS (Administrative/Medical Necessity) [phone]   [address] (Maternity/NICU/Pediatrics) [phone]   [address] 40 125 [facility]  [address] [id number] [address] [facility] [phone]

## 2022-09-24 NOTE — ASSESSMENT & PLAN NOTE
· WBC 12 on admission  · Suspect from steroid injection   · Does not meet sepsis criteria, afebrile  · Check UA  · Trend CBC

## 2022-09-24 NOTE — ASSESSMENT & PLAN NOTE
· WBC 12 on admission  · Suspect from steroid injection   · Does not meet sepsis criteria, afebrile  · neg UA

## 2022-09-24 NOTE — ASSESSMENT & PLAN NOTE
[facility] Heart Cardiology Progress Note      Clifford Millard Patient Status:  Inpatient    1929 MRN [id number]   Location [facility] 3W/SW Attending Laurie Philippe MD   Hosp Day # 3 PCP Oli Torre MD · Secondary to intractable back pain  Unable to ambulate in the ED     · Consult PT/OT for evaluation oriented x3,   Neck:supple,no JVD  Pulm: Lungs clear, normal respiratory effort  CV:  Heart with regular rate and irregularly irregular rhythm, Nl S1,S2 ,no S3 or murmur  Abd: Abdomen soft, nontender, nondistended, no organomegaly, bowel sounds present  Ex

## 2022-09-26 NOTE — UTILIZATION REVIEW
Observation Admission Authorization Request   NOTIFICATION OF OBSERVATION ADMISSION/OBSERVATION AUTHORIZATION REQUEST   SERVICING FACILITY:   [address] [place], [place] [address]fadia Cabrera [address] [place] [address], [address]  Tax ID: [id number]  NPI: [id number]  Place of Service: On [address] [facility] Code: 22  CPT Code for Observation: CPT   CPT 72403     ATTENDING PROVIDER:  Attending Name and NPI#: Figueroa Dangelo Md [[id number]]  [facility] [address] [place] [address], [facility] [address] Desai  Phone: [phone]     UTILIZATION REVIEW CONTACT:  Mohan Schaefer, Utilization   Network Utilization Review Department  Phone: [phone]  Fax [phone]  Email: [email or web address]     PHYSICIAN ADVISORY SERVICES:  FOR [id number]-[id number] REVIEW - MEDICAL NECESSITY DENIAL  Phone: [phone]  Fax: [phone]  Email: [email or web address]     TYPE OF REQUEST:  Observation Status     ADMISSION INFORMATION:  ADMISSION DATE/TIME:   PATIENT DIAGNOSIS CODE/DESCRIPTION:  Back pain [M54 9]  Gait difficulty [R26 9]  Acute right-sided low back pain without sciatica [M54 50]  DISCHARGE DATE/TIME: 9/24/2022 11:49 AM   IMPORTANT INFORMATION:  Please contact Jade Jerez directly with any questions or concerns regarding this request  Department voicemails are confidential     Send requests for admission clinical reviews, concurrent reviews, approvals, and administrative denials due to lack of clinical to fax [phone]

## 2022-10-04 RX ORDER — LISINOPRIL 10 MG/1
10 TABLET ORAL
COMMUNITY
Start: 2022-05-25 | End: 2022-10-07

## 2022-10-04 RX ORDER — PRENATAL VIT 91/IRON/FOLIC/DHA 28-975-200
COMBINATION PACKAGE (EA) ORAL
COMMUNITY
Start: 2022-04-06 | End: 2022-10-07

## 2022-10-04 RX ORDER — ROSUVASTATIN CALCIUM 20 MG/1
20 TABLET, COATED ORAL
COMMUNITY
Start: 2010-08-13 | End: 2022-10-07

## 2022-10-04 RX ORDER — PREDNISONE 20 MG/1
TABLET ORAL
COMMUNITY
Start: 2022-09-07 | End: 2022-10-07

## 2022-10-04 RX ORDER — BETAMETHASONE DIPROPIONATE 0.5 MG/G
CREAM TOPICAL
COMMUNITY
Start: 2022-04-14

## 2022-10-04 RX ORDER — ASPIRIN 81 MG/1
TABLET ORAL
COMMUNITY
End: 2022-10-07

## 2022-10-04 RX ORDER — DESLORATADINE 5 MG/1
TABLET ORAL
COMMUNITY
End: 2022-10-07

## 2022-10-04 RX ORDER — CLOPIDOGREL BISULFATE 75 MG/1
TABLET ORAL
COMMUNITY
Start: 2022-05-25 | End: 2022-10-07

## 2022-10-07 ENCOUNTER — CONSULT (OUTPATIENT)
Dept: PAIN MEDICINE | Facility: CLINIC | Age: 57
End: 2022-10-07
Payer: COMMERCIAL

## 2022-10-07 ENCOUNTER — TELEPHONE (OUTPATIENT)
Dept: PAIN MEDICINE | Facility: CLINIC | Age: 57
End: 2022-10-07

## 2022-10-07 VITALS
RESPIRATION RATE: 20 BRPM | DIASTOLIC BLOOD PRESSURE: 52 MMHG | SYSTOLIC BLOOD PRESSURE: 108 MMHG | HEIGHT: 67 IN | HEART RATE: 67 BPM | WEIGHT: 182.8 LBS | BODY MASS INDEX: 28.69 KG/M2

## 2022-10-07 DIAGNOSIS — M47.816 LUMBAR SPONDYLOSIS: Primary | ICD-10-CM

## 2022-10-07 DIAGNOSIS — M54.16 LUMBAR RADICULOPATHY: ICD-10-CM

## 2022-10-07 DIAGNOSIS — M54.50 ACUTE RIGHT-SIDED LOW BACK PAIN WITHOUT SCIATICA: ICD-10-CM

## 2022-10-07 PROCEDURE — 99244 OFF/OP CNSLTJ NEW/EST MOD 40: CPT | Performed by: ANESTHESIOLOGY

## 2022-10-07 RX ORDER — GABAPENTIN 300 MG/1
300 CAPSULE ORAL 3 TIMES DAILY
Qty: 90 CAPSULE | Refills: 0 | Status: SHIPPED | OUTPATIENT
Start: 2022-10-07

## 2022-10-07 RX ORDER — CYCLOBENZAPRINE HCL 10 MG
TABLET ORAL
COMMUNITY
Start: 2022-09-23 | End: 2022-10-07

## 2022-10-07 NOTE — LETTER
October 7, 2022     Agus Barragan MD  [address]    Patient: Froilan Pena   YOB: 1965   Date of Visit: 10/7/2022       Dear Dr Desiree Anand: Thank you for referring Froilan Pena to me for evaluation  Below are my notes for this consultation  If you have questions, please do not hesitate to call me  I look forward to following your patient along with you  Sincerely,        Rubi Chamberlain MD        CC: No Recipients  Rubi Chamberlain MD  10/7/2022  8:06 AM  Signed      Assessment  1  Lumbar spondylosis    2  Acute right-sided low back pain without sciatica - Right  -     Ambulatory Referral to Pain Management    Right-sided lumbar radicular pain in the L2 dermatomal distribution accompanied by pain limited weakness numbness and paresthesias which have since largely resolved  Patient has not yet participated with PT  Chronic pain with decreased participation with IADLs over the past 2 months  Has been taking OTC tylenol in addition to steroids and Robaxin with modest benefit  5/5 strength bilaterally, negative SLR b/l  Reflexes 2+  Additionally there is minimal facet loading eliciting pain  Denies any gait instability, saddle anesthesia  On imaging right lateral recess stenosis at L2-L3 causing right sided transforaminal stenosis  Risks, benefits alternatives epidural steroid injections thoroughly discussed with patient  Given triple antiplatelet therapy and cardiac history of Kawasaki disease/MI best to hold off on interventional procedures and proceed with medication management  Handouts provided questions answered to patient's satisfaction  Lifestyle modifications extensively discussed including diet, exercise and weight loss in addition to core strengthening    Will proceed with multimodal pain therapy plan as noted below:    Plan  -f/u 4 weeks  -gabapentin 300 mg t i d  Ordered for patient; counseled regarding sedative effects of taking this medication and provided up titration calendar  Counseled not to take medication while driving or operating heavy machinery/using stairs  -script provided for formal physical therapy for right-sided lumbar radiculopathy; Physician directed home exercise plan as per [facility] demonstrated and handouts provided that patient plans to participate with for 1 hour, twice a week for the next 6 weeks  There are risks associated with opioid medications, including dependence, addiction and tolerance  The patient understands and agrees to use these medications only as prescribed  Potential side effects of the medications include, but are not limited to, constipation, drowsiness, addiction, impaired judgment and risk of fatal overdose if not taken as prescribed  The patient was warned against driving while taking sedation medications or operating heavy machinery  The patient voiced understanding  Sharing medications is a felony  At this point in time, the patient is showing no signs of addiction, abuse, diversion or suicidal ideation  [place] Prescription Drug Monitoring Program report was reviewed and was appropriate      Complete risks and benefits including bleeding, infection, tissue reaction, nerve injury and allergic reaction were discussed  The approach was demonstrated using models and literature was provided  Verbal and written consent was obtained  My impressions and treatment recommendations were discussed in detail with the patient who verbalized understanding and had no further questions  Discharge instructions were provided  I personally saw and examined the patient and I agree with the above discussed plan of care        New Medications Ordered This Visit   Medications    betamethasone dipropionate (DIPROSONE) 0 05 % cream     Sig: Apply topically       History of Present Illness    Harmony Del Rosario is a 62 y o  male with a hx of prior MI/Kawasaki disease, previous CABG X5 and repeat stenosis [id number] requiring triple antiplatelet therapy presenting with 6 week history of lumbar radicular pain in the right L2 dermatomal distribution  Debilitating pain limited weakness numbness and paresthesias accompany the pain  Pain has vastly improved with time  The patient rates the pain at a 3-4/10 accompanied by electric shock-like shooting features and crampy burning pain in the aforementioned dermatomal distributions  The pain is worse in the mornings as well as the end of the day; exertion such as walking for long periods of time seems to exacerbate the pain  He tries to maintain an active lifestyle and finds that the current degree of pain seems to compromises his efforts  The pain significantly impacts independent activities of daily living and contributes to significant disability  He has not yet attempted formal PT  He has taken steroids and muscle relaxers with limited relief of the pain as well  He has never tried epidural steroid injections in the past  He denies any bowel or bladder dysfunction/incontinence, saddle anesthesia and gait instability  I have personally reviewed and/or updated the patient's past medical history, past surgical history, family history, social history, current medications, allergies, and vital signs today  Review of Systems   Constitutional: Positive for activity change  HENT: Negative  Eyes: Negative  Respiratory: Negative  Cardiovascular: Negative  Gastrointestinal: Negative  Endocrine: Negative  Genitourinary: Negative  Musculoskeletal: Positive for arthralgias, back pain, gait problem and myalgias  Skin: Negative  Allergic/Immunologic: Negative  Neurological: Positive for numbness  Negative for weakness  Hematological: Negative  Psychiatric/Behavioral: Negative  All other systems reviewed and are negative        Patient Active Problem List   Diagnosis    CAD (coronary artery disease)    Essential hypertension    Ambulatory dysfunction    Intractable back pain    Leukocytosis    Acute right-sided low back pain without sciatica - Right    Lumbar spondylosis       Past Medical History:   Diagnosis Date    Hyperlipidemia     Hypertension     Myocardial infarct [facility])        Past Surgical History:   Procedure Laterality Date    CARDIAC OTHER  09/2016    CARDIAC SURGERY      [address] way heart bypass 2016       Family History   Problem Relation Age of Onset    Heart disease Mother     Heart disease Father        Social History     Occupational History    Not on file   Tobacco Use    Smoking status: Never Smoker    Smokeless tobacco: Never Used   Substance and Sexual Activity    Alcohol use: Not Currently    Drug use: Not Currently    Sexual activity: Not Currently       Current Outpatient Medications on File Prior to Visit   Medication Sig    aspirin (ECOTRIN LOW STRENGTH) 81 mg EC tablet Take 81 mg by mouth daily    betamethasone dipropionate (DIPROSONE) 0 05 % cream Apply topically    clopidogrel (PLAVIX) 75 mg tablet Take 75 mg by mouth daily    ezetimibe (ZETIA) 10 mg tablet Take 10 mg by mouth daily after dinner    lisinopril (ZESTRIL) 10 mg tablet Take 10 mg by mouth 2 (two) times a day    rivaroxaban (XARELTO) 2 5 mg tablet Take 2 5 mg by mouth 2 (two) times a day    rosuvastatin (CRESTOR) 20 MG tablet Take 20 mg by mouth daily    [DISCONTINUED] acetaminophen (TYLENOL) 325 mg tablet Take 2 tablets (650 mg total) by mouth 3 (three) times a day before meals for 15 days    [DISCONTINUED] clopidogrel (PLAVIX) 75 mg tablet Take by mouth    [DISCONTINUED] lisinopril (ZESTRIL) 10 mg tablet Take 10 mg by mouth    [DISCONTINUED] mupirocin (BACTROBAN) 2 % ointment Apply topically    [DISCONTINUED] predniSONE 20 mg tablet     [DISCONTINUED] rosuvastatin (CRESTOR) 20 MG tablet Take 20 mg by mouth    [DISCONTINUED] terbinafine (LamISIL) 1 % cream     methocarbamol (ROBAXIN) 500 mg tablet Take 1 tablet (500 mg total) by mouth 3 (three) times a day for 10 days    [DISCONTINUED] aspirin (ECOTRIN LOW STRENGTH) 81 mg EC tablet Take by mouth    [DISCONTINUED] cyclobenzaprine (FLEXERIL) 10 mg tablet     [DISCONTINUED] desloratadine (Clarinex) 5 MG tablet Take by mouth    [DISCONTINUED] melatonin 3 mg Take 1 tablet (3 mg total) by mouth daily at bedtime     No current facility-administered medications on file prior to visit  No Known Allergies      Physical Exam    /52   Pulse 67   Resp 20   Ht 5' 6 5" (1 689 m)   Wt 82 9 kg (182 lb 12 8 oz)   BMI 29 06 kg/m²     Constitutional: normal, well developed, well nourished, alert, in no distress and non-toxic and no overt pain behavior  and overweight  Eyes: anicteric  HEENT: grossly intact  Neck: supple, symmetric, trachea midline and no masses   Pulmonary:even and unlabored  Cardiovascular:No edema or pitting edema present  Skin:Normal without rashes or lesions and well hydrated  Psychiatric:Mood and affect appropriate  Neurologic:Cranial Nerves II-XII grossly intact Sensation grossly intact; no clonus negative ross's  Reflexes 2+ and brisk  SLR negative bilaterally  Musculoskeletal:normal gait  5/5 strength bilaterally with AROM in all extremities  Normal heel toe and tip toe walking  No pain with lumbar facet loading bilaterally and with lateral spine rotation  No ttp over lumbar paraspinal muscles  Negative hudson's test, negative gaenslen's negative SIJ loading bilaterally      Imaging    MRI lumbar spine noncontrast shows right lateral recess stenosis at L2-L3 causing lmild to moderate transforaminal stenosis

## 2022-10-07 NOTE — PROGRESS NOTES
Assessment  1  Lumbar spondylosis    2  Acute right-sided low back pain without sciatica - Right  -     Ambulatory Referral to Pain Management    Right-sided lumbar radicular pain in the L2 dermatomal distribution accompanied by pain limited weakness numbness and paresthesias which have since largely resolved  Patient has not yet participated with PT  Chronic pain with decreased participation with IADLs over the past 2 months  Has been taking OTC tylenol in addition to steroids and Robaxin with modest benefit  5/5 strength bilaterally, negative SLR b/l  Reflexes 2+  Additionally there is minimal facet loading eliciting pain  Denies any gait instability, saddle anesthesia  On imaging right lateral recess stenosis at L2-L3 causing right sided transforaminal stenosis  Risks, benefits alternatives epidural steroid injections thoroughly discussed with patient  Given triple antiplatelet therapy and cardiac history of Kawasaki disease/MI best to hold off on interventional procedures and proceed with medication management  Handouts provided questions answered to patient's satisfaction  Lifestyle modifications extensively discussed including diet, exercise and weight loss in addition to core strengthening  Will proceed with multimodal pain therapy plan as noted below:    Plan  -f/u 4 weeks  -gabapentin 300 mg t i d  Ordered for patient; counseled regarding sedative effects of taking this medication and provided up titration calendar  Counseled not to take medication while driving or operating heavy machinery/using stairs  -script provided for formal physical therapy for right-sided lumbar radiculopathy; Physician directed home exercise plan as per [facility] demonstrated and handouts provided that patient plans to participate with for 1 hour, twice a week for the next 6 weeks  There are risks associated with opioid medications, including dependence, addiction and tolerance   The patient understands and agrees to use these medications only as prescribed  Potential side effects of the medications include, but are not limited to, constipation, drowsiness, addiction, impaired judgment and risk of fatal overdose if not taken as prescribed  The patient was warned against driving while taking sedation medications or operating heavy machinery  The patient voiced understanding  Sharing medications is a felony  At this point in time, the patient is showing no signs of addiction, abuse, diversion or suicidal ideation  [place] Prescription Drug Monitoring Program report was reviewed and was appropriate      Complete risks and benefits including bleeding, infection, tissue reaction, nerve injury and allergic reaction were discussed  The approach was demonstrated using models and literature was provided  Verbal and written consent was obtained  My impressions and treatment recommendations were discussed in detail with the patient who verbalized understanding and had no further questions  Discharge instructions were provided  I personally saw and examined the patient and I agree with the above discussed plan of care  New Medications Ordered This Visit   Medications    betamethasone dipropionate (DIPROSONE) 0 05 % cream     Sig: Apply topically       History of Present Illness    Merlin Husky is a 62 y o  male with a hx of prior MI/Kawasaki disease, previous CABG X5 and repeat stenosis [id number] requiring triple antiplatelet therapy presenting with 6 week history of lumbar radicular pain in the right L2 dermatomal distribution  Debilitating pain limited weakness numbness and paresthesias accompany the pain  Pain has vastly improved with time  The patient rates the pain at a 3-4/10 accompanied by electric shock-like shooting features and crampy burning pain in the aforementioned dermatomal distributions    The pain is worse in the mornings as well as the end of the day; exertion such as walking for long periods of time seems to exacerbate the pain  He tries to maintain an active lifestyle and finds that the current degree of pain seems to compromises his efforts  The pain significantly impacts independent activities of daily living and contributes to significant disability  He has not yet attempted formal PT  He has taken steroids and muscle relaxers with limited relief of the pain as well  He has never tried epidural steroid injections in the past  He denies any bowel or bladder dysfunction/incontinence, saddle anesthesia and gait instability  I have personally reviewed and/or updated the patient's past medical history, past surgical history, family history, social history, current medications, allergies, and vital signs today  Review of Systems   Constitutional: Positive for activity change  HENT: Negative  Eyes: Negative  Respiratory: Negative  Cardiovascular: Negative  Gastrointestinal: Negative  Endocrine: Negative  Genitourinary: Negative  Musculoskeletal: Positive for arthralgias, back pain, gait problem and myalgias  Skin: Negative  Allergic/Immunologic: Negative  Neurological: Positive for numbness  Negative for weakness  Hematological: Negative  Psychiatric/Behavioral: Negative  All other systems reviewed and are negative        Patient Active Problem List   Diagnosis    CAD (coronary artery disease)    Essential hypertension    Ambulatory dysfunction    Intractable back pain    Leukocytosis    Acute right-sided low back pain without sciatica - Right    Lumbar spondylosis       Past Medical History:   Diagnosis Date    Hyperlipidemia     Hypertension     [facility])        Past Surgical History:   Procedure Laterality Date    CARDIAC OTHER  09/2016    CARDIAC SURGERY      [address] way heart bypass 2016       Family History   Problem Relation Age of Onset    Heart disease Mother     Heart disease Father        Social History     Occupational History    Not on file   Tobacco Use    Smoking status: Never Smoker    Smokeless tobacco: Never Used   Substance and Sexual Activity    Alcohol use: Not Currently    Drug use: Not Currently    Sexual activity: Not Currently       Current Outpatient Medications on File Prior to Visit   Medication Sig    aspirin (ECOTRIN LOW STRENGTH) 81 mg EC tablet Take 81 mg by mouth daily    betamethasone dipropionate (DIPROSONE) 0 05 % cream Apply topically    clopidogrel (PLAVIX) 75 mg tablet Take 75 mg by mouth daily    ezetimibe (ZETIA) 10 mg tablet Take 10 mg by mouth daily after dinner    lisinopril (ZESTRIL) 10 mg tablet Take 10 mg by mouth 2 (two) times a day    rivaroxaban (XARELTO) 2 5 mg tablet Take 2 5 mg by mouth 2 (two) times a day    rosuvastatin (CRESTOR) 20 MG tablet Take 20 mg by mouth daily    [DISCONTINUED] acetaminophen (TYLENOL) 325 mg tablet Take 2 tablets (650 mg total) by mouth 3 (three) times a day before meals for 15 days    [DISCONTINUED] clopidogrel (PLAVIX) 75 mg tablet Take by mouth    [DISCONTINUED] lisinopril (ZESTRIL) 10 mg tablet Take 10 mg by mouth    [DISCONTINUED] mupirocin (BACTROBAN) 2 % ointment Apply topically    [DISCONTINUED] predniSONE 20 mg tablet     [DISCONTINUED] rosuvastatin (CRESTOR) 20 MG tablet Take 20 mg by mouth    [DISCONTINUED] terbinafine (LamISIL) 1 % cream     methocarbamol (ROBAXIN) 500 mg tablet Take 1 tablet (500 mg total) by mouth 3 (three) times a day for 10 days    [DISCONTINUED] aspirin (ECOTRIN LOW STRENGTH) 81 mg EC tablet Take by mouth    [DISCONTINUED] cyclobenzaprine (FLEXERIL) 10 mg tablet     [DISCONTINUED] desloratadine (Clarinex) 5 MG tablet Take by mouth    [DISCONTINUED] melatonin 3 mg Take 1 tablet (3 mg total) by mouth daily at bedtime     No current facility-administered medications on file prior to visit         No Known Allergies      Physical Exam    /52   Pulse 67   Resp 20   Ht 5' 6 5" (1 689 m)   Wt 82 9 kg (182 lb 12 8 oz)   BMI 29 06 kg/m²     Constitutional: normal, well developed, well nourished, alert, in no distress and non-toxic and no overt pain behavior  and overweight  Eyes: anicteric  HEENT: grossly intact  Neck: supple, symmetric, trachea midline and no masses   Pulmonary:even and unlabored  Cardiovascular:No edema or pitting edema present  Skin:Normal without rashes or lesions and well hydrated  Psychiatric:Mood and affect appropriate  Neurologic:Cranial Nerves II-XII grossly intact Sensation grossly intact; no clonus negative ross's  Reflexes 2+ and brisk  SLR negative bilaterally  Musculoskeletal:normal gait  5/5 strength bilaterally with AROM in all extremities  Normal heel toe and tip toe walking  No pain with lumbar facet loading bilaterally and with lateral spine rotation  No ttp over lumbar paraspinal muscles  Negative hudson's test, negative gaenslen's negative SIJ loading bilaterally      Imaging    MRI lumbar spine noncontrast shows right lateral recess stenosis at L2-L3 causing lmild to moderate transforaminal stenosis

## 2022-10-07 NOTE — PATIENT INSTRUCTIONS
Gabapentin (By mouth)   Gabapentin (candy-a-PEN-tin)  Treats seizures and pain caused by shingles  Brand Name(s): FusePaq Fanatrex, Neurontin   There may be other brand names for this medicine  When This Medicine Should Not Be Used: This medicine is not right for everyone  Do not use it if you had an allergic reaction to gabapentin  How to Use This Medicine:   Capsule, Liquid, Tablet  Take your medicine as directed  Your dose may need to be changed several times to find what works best for you  If you have epilepsy, do not allow more than 12 hours to pass between doses  Capsule: Swallow the capsule whole with plenty of water  Do not open, crush, or chew it  Gralise® tablet: Swallow the tablet whole   Do not crush, break, or chew it  Neurontin® tablet: If you break a tablet into 2 pieces, use the second half as your next dose  Do not use the half-tablet if the whole tablet has been cut or broken after 28 days  Oral liquid: Measure the oral liquid medicine with a marked measuring spoon, oral syringe, or medicine cup  This medicine should come with a Medication Guide  Ask your pharmacist for a copy if you do not have one  Missed dose: Take a dose as soon as you remember  If it is almost time for your next dose, wait until then and take a regular dose  Do not take extra medicine to make up for a missed dose  Store the medicine in a closed container at room temperature, away from heat, moisture, and direct light  Store the Neurontin® oral liquid in the refrigerator  Do not freeze  Drugs and Foods to Avoid:   Ask your doctor or pharmacist before using any other medicine, including over-the-counter medicines, vitamins, and herbal products  Some medicines can affect how gabapentin works  Tell your doctor if you also using hydrocodone or morphine  If you take an antacid, wait at least 2 hours before you take gabapentin  Do not drink alcohol while you are using this medicine    Tell your doctor if you use anything else that makes you sleepy  Some examples are allergy medicine, narcotic pain medicine, and alcohol  Tell your doctor if you are also using lorazepam, oxycodone, or zolpidem  Warnings While Using This Medicine:   Tell your doctor if you are pregnant or breastfeeding, or if you have kidney problems (including patients receiving dialysis) or lung problems  Tell your doctor if you have a history of depression or mental health problems  This medicine may cause the following problems:  Drug reaction with eosinophilia and systemic symptoms (DRESS) or multiorgan hypersensitivity, which may damage the liver, kidney, blood, heart, or muscles  Changes in mood or behavior, including suicidal thoughts or behavior  Respiratory depression (serious breathing problem that can be life-threatening), when used with narcotic pain medicines  Do not stop using this medicine suddenly  Your doctor will need to slowly decrease your dose before you stop it completely  This medicine may make you dizzy or drowsy  Do not drive or do anything else that could be dangerous until you know how this medicine affects you  Tell any doctor or dentist who treats you that you are using this medicine  This medicine may affect certain medical test results  Your doctor will check your progress and the effects of this medicine at regular visits  Keep all appointments  Keep all medicine out of the reach of children  Never share your medicine with anyone  Possible Side Effects While Using This Medicine:   Call your doctor right away if you notice any of these side effects:   Allergic reaction: Itching or hives, swelling in your face or hands, swelling or tingling in your mouth or throat, chest tightness, trouble breathing  Behavior problems, aggression, restlessness, trouble concentrating, moodiness (especially in children)  Blistering, peeling, red skin rash  Blue lips, fingernails, or skin, chest pain, fast heartbeat, trouble breathing  Change in how much or how often you urinate, bloody or cloudy urine  Dark urine or pale stools, nausea, vomiting, loss of appetite, stomach pain, yellow skin or eyes  Fever, chills, cough, sore throat, body aches  Problems with coordination, shakiness, unsteadiness, unusual eye movement  Rapid weight gain, swelling in your hands, ankles, or feet  Rash, swollen or tender glands in the neck, armpit, or groin  Unusual moods or behaviors, thoughts of hurting yourself, feeling depressed  If you notice these less serious side effects, talk with your doctor:   Dizziness, drowsiness, sleepiness, tiredness  If you notice other side effects that you think are caused by this medicine, tell your doctor  Call your doctor for medical advice about side effects  You may report side effects to FDA at 9-577-FDA-4922    © Copyright [employer] 2022 Information is for End User's use only and may not be sold, redistributed or otherwise used for commercial purposes  The above information is an  only  It is not intended as medical advice for individual conditions or treatments  Talk to your doctor, nurse or pharmacist before following any medical regimen to see if it is safe and effective for you  Core Strengthening Exercises   WHAT YOU NEED TO KNOW:   Your core includes the muscles of your lower back, hip, pelvis, and abdomen  Core strengthening exercises help heal and strengthen these muscles  This helps prevent another injury, and keeps your pelvis, spine, and hips in the correct position  DISCHARGE INSTRUCTIONS:   Contact your healthcare provider if:   You have sharp or worsening pain during exercise or at rest     You have questions or concerns about your shoulder exercises  Safety tips:  Talk to your healthcare provider before you start an exercise program  A physical therapist can teach you how to do core strengthening exercises safely  Do the exercises on a mat or firm surface    A firm surface will support your spine and prevent low back pain  Do not do these exercises on a bed  Move slowly and smoothly  Avoid fast or jerky motions  Stop if you feel pain  Core exercises should not be painful  Stop if you feel pain  Breathe normally during core exercises  Do not hold your breath  This may cause an increase in blood pressure and prevent muscle strengthening  Your healthcare provider will tell you when to inhale and exhale during the exercise  Begin all of your exercises with abdominal bracing  Abdominal bracing helps warm up your core muscles  You can also practice abdominal bracing throughout the day  Lie on your back with your knees bent and feet flat on the floor  Place your arms in a relaxed position beside your body  Tighten your abdominal muscles  Pull your belly button in and up toward your spine  Hold for 5 seconds  Relax your muscles  Repeat 10 times  Core strengthening exercises: Your healthcare provider will tell you how often to do these exercises  The provider will also tell you how many repetitions of each exercise you should do  Hold each exercise for 5 seconds or as directed  As you get stronger, increase your hold to 10 to 15 seconds  You can do some of these exercises on a stability ball, or with a weight  Ask your healthcare provider how to use a stability ball or weight for these exercises:  Bridging:  Lie on your back with your knees bent and feet flat on the floor  Rest your arms at your side  Tighten your buttocks, and then lift your hips 1 inch off the floor  Hold for 5 seconds  When you can do this exercise without pain for 10 seconds, increase the distance you lift your hips  A good goal is to be able to lift your hips so that your shoulders, hips, and knees are in a straight line  Dead bug:  Lie on your back with your knees bent and feet flat on the floor  Place your arms in a relaxed position beside your body  Begin with abdominal bracing   Next, raise one leg, keeping your knee bent  Hold for 5 seconds  Repeat with the other leg  When you can do this exercise without pain for 10 to 15 seconds, you may raise one straight leg and hold  Repeat with the other leg  Quadruped:  Place your hands and knees on the floor  Keep your wrists directly below your shoulders and your knees directly below your hips  Pull your belly button in toward your spine  Do not flatten or arch your back  Tighten your abdominal muscles below your belly button  Hold for 5 seconds  When you can do this exercise without pain for 10 to 15 seconds, you may extend one arm and hold  Repeat on the other side  Side bridge exercises:      Standing side bridge:  Stand next to a wall and extend one arm toward the wall  Place your palm flat on the wall with your fingers pointing upward  Begin with abdominal bracing  Next, without moving your feet, slowly bend your arm to 90 degrees  Hold for 5 seconds  Repeat on the other side  When you can do this exercise without pain for 10 to 15 seconds, you may do the bent leg side bridge on the floor  Bent leg side bridge:  Lie on one side with your legs, hips, and shoulders in a straight line  Prop yourself up onto your forearm so your elbow is directly below your shoulder  Bend your knees back to 90 degrees  Begin with abdominal bracing  Next, lift your hips and balance yourself on your forearm and knees  Hold for 5 seconds  Repeat on the other side  When you can do this exercise without pain for 10 to 15 seconds, you may do the straight leg side bridge on the floor  Straight leg side bridge:  Lie on one side with your legs, hips, and shoulders in a straight line  Prop yourself up onto your forearm so your elbow is directly below your shoulder  Begin with abdominal bracing  Lift your hips off the floor and balance yourself on your forearm and the outside of your flexed foot  Do not let your ankle bend sideways  Hold for 5 seconds  Repeat on the other side  When you can do this exercise without pain for 10 to 15 seconds, ask your healthcare provider for more advanced exercises  Superman:  Lie on your stomach  Extend your arms forward on the floor  Tighten your abdominal muscles and lift your right hand and left leg off the floor  Hold this position  Slowly return to the starting position  Tighten your abdominal muscles and lift your left hand and right leg off the floor  Hold this position  Slowly return to the starting position  Clam:  Lie on your side with your knees bent  Put your bottom arm under your head to keep your neck in line  Put your top hand on your hip to keep your pelvis from moving  Put your heels together, and keep them together during this exercise  Slowly raise your top knee toward the ceiling  Then lower your leg so your knees are together  Repeat this exercise 10 times  Then switch sides and do the exercise 10 times with the other leg  Curl up:  Lie on your back with your knees bent and feet flat on the floor  Place your hands, palms down, underneath your lower back  Next, with your elbows on the floor, lift your shoulders and chest 2 to 3 inches off the floor  Keep your head in line with your shoulders  Hold this position  Slowly return to the starting position  Straight leg raises:  Lie on your back with one leg straight  Bend the other knee and place your foot flat on the floor  Tighten your abdominal muscles  Keep your leg straight and slowly lift it straight up 6 to 12 inches off the floor  Hold this position  Lower your leg slowly  Do as many repetitions as directed on this side  Repeat with the other leg  Plank:  Lie on your stomach  Bend your elbows and place your forearms flat on the floor  Lift your chest, stomach, and knees off the floor  Make sure your elbows are below your shoulders  Your body should be in a straight line   Do not let your hips or lower back sink to the ground  Squeeze your abdominal muscles together and hold for 15 seconds  To make this exercise harder, hold for 30 seconds or lift 1 leg at a time  Bicycles:  Lie on your back  Bend both knees and bring them toward your chest  Your calves should be parallel to the floor  Place the palms of your hands on the back of your head  Straighten your right leg and keep it lifted 2 inches off the floor  Raise your head and shoulders off the floor and twist towards your left  Keep your head and shoulders lifted  Bend your right knee while you straighten your left leg  Keep your left leg 2 inches off the floor  Twist your head and chest towards the left leg  Continue to straighten 1 leg at a time and twist        Follow up with your doctor as directed:  Write down your questions so you remember to ask them during your visits  © Copyright [employer] 2022 Information is for End User's use only and may not be sold, redistributed or otherwise used for commercial purposes  All illustrations and images included in CareNotes® are the copyrighted property of A D A M [employer] Inc  or Mathew Dodson   The above information is an  only  It is not intended as medical advice for individual conditions or treatments  Talk to your doctor, nurse or pharmacist before following any medical regimen to see if it is safe and effective for you

## 2022-10-17 ENCOUNTER — EVALUATION (OUTPATIENT)
Dept: PHYSICAL THERAPY | Facility: CLINIC | Age: 57
End: 2022-10-17
Payer: COMMERCIAL

## 2022-10-17 DIAGNOSIS — M54.16 LUMBAR RADICULOPATHY: ICD-10-CM

## 2022-10-17 PROCEDURE — 97530 THERAPEUTIC ACTIVITIES: CPT

## 2022-10-17 PROCEDURE — 97161 PT EVAL LOW COMPLEX 20 MIN: CPT

## 2022-10-17 NOTE — PROGRESS NOTES
PT Evaluation     Today's date: 10/17/2022  Patient name: Deng Lima  : 1965  MRN: [id number]  Referring provider: Shabana Mayberry MD  Dx:   Encounter Diagnosis     ICD-10-CM    1  Lumbar radiculopathy  M54 16 Ambulatory referral to Physical Therapy                  Assessment  Assessment details: Patient is a 62 y o  male who presents to PT with recent dx of L/S HNP and pain  Patient is able to demonstrate fair AROM of the L/S with mild pain noted at endranges  Patient has fair core strength  He has 4+/5 strength of the BLE  Patient has poor flexibility of the BLE and trunk  He has tenderness with palpation of the L/S  Patient is able to ambulate with grossly normal gait pattern over level surfaces  Patient is expected to respond well to PT intervention to improve flexibility of the BLE and core stability  Impairments: abnormal or restricted ROM, activity intolerance and pain with function  Understanding of Dx/Px/POC: excellent  Goals  STG - 2 weeks  Patient able to demonstrate increased L/S AROM to painfree AROM  Patient to report pain at worst 2/10 with daily activity  Patient to have increased flexibility of HS to WNL  Patient to be independent with HEP  LTG - 6 weeks  Patient able to demonstrate painfree AROM fo the L/S  Patient able to demonstrate good posture in sitting and standing  Patient able to demonstrate proper body mechanics for lifting and carrying items  Patient able to ambulate with normalized gait pattern  Patient to demonstrate core strength at  4+/5  Patient to be independent with final HEP  Plan  Plan details: Patient's evaluation is completed  Patient was instructed with a HEP this date  Patient has scheduled further PT sessions for treatment     Patient would benefit from: skilled physical therapy  Planned modality interventions: ultrasound, unattended electrical stimulation and cryotherapy  Planned therapy interventions: manual therapy, neuromuscular re-education, therapeutic exercise, therapeutic activities and home exercise program  Frequency: 2x week  Duration in weeks: 8  Plan of Care beginning date: 10/17/2022  Plan of Care expiration date: 2022        Subjective Evaluation    History of Present Illness  Mechanism of injury: Patient notes injury to his L/S 12-15 years ago  He notes that he did undergo to treatment at that time  He notes that he has done well until 8  Weeks ago when he was pulling in his crab traps that were heavy  He notes the pain started a day or two later  He notes he did see his chiropractor for a number of sessions however symptoms did not resolve  He notes that he could not get his socks on himself  He notes that he did go to the ER and stayed a night the hospital  He notes that pain has improved  He notes that he continues to have limited tolerance to lifting heavy items  Inner right thigh numbness persists  HNP L2     HX of 3 heart attacks  5x CABG  - 2016, 2020, 2020  Lise Mura Disease  Pain  Current pain ratin  At best pain ratin  At worst pain rating: 10      Diagnostic Tests  MRI studies: abnormal  Treatments  Previous treatment: chiropractic and medication  Patient Goals  Patient goals for therapy: decreased pain, increased motion and increased strength          Objective     Palpation   Left   Hypertonic in the erector spinae and lumbar paraspinals  Right   Hypertonic in the erector spinae and lumbar paraspinals       Neurological Testing     Sensation     Lumbar   Left   Intact: light touch    Right   Intact: light touch    Additional Neurological Details  Numbness noted at the right inner thigh    Active Range of Motion     Lumbar   Flexion: 75 degrees  with pain  Extension:  WFL  Left lateral flexion:  WFL  Right lateral flexion:  WFL  Left rotation:  WFL  Right rotation:  [facility]    Joint Play     Pain: L3, L4, L5 and S1     Strength/Myotome Testing     Left Hip   Planes of Motion   Flexion: 4+  Extension: 4+  Abduction: 4+    Right Hip   Planes of Motion   Flexion: 4+  Extension: 4+  Abduction: 4+    Left Knee   Flexion: 4+  Extension: 4+    Right Knee   Flexion: 4+  Extension: 4+    Tests     Lumbar   Positive SIJ compression  Functional Assessment      Squat    within functional limits             Precautions: CABG x5, hx of 3 MI     Daily Treatment Diary:      Initial Evaluation Date: 10/17/22  Compliance 10/17                     Visit Number 1                    Re-Eval  IE                 [facility]   Foto Captured Y                           10/17                     Manual                      Stretch- HS/ Piriformis/ ER/IR                                                                  Ther-Ex                      TA setting                      TA - SLR                      TA- march                      TA- bridge                      TA - adduction                                            sidely clamshell                      sidely hip abd                                   Quadruped - rock on all 4's             Cat/Cow             Hip ext                          Prone- opp arm and leg lift             Supermans                                            Neuro Re-Ed                                                                          Ther-Act             Patient ed  HEP instruct                                                Modalities                                                                                       Access Code: [email or web address]  URL: [email or web address]  Date: 10/17/2022  Prepared by: José Manuel Schafer    Exercises  · Supine Piriformis Stretch with Foot on Ground - 1 x daily - 7 x weekly - 1 sets - 3 reps - 15 hold  · Supine Single Knee to Chest Stretch - 1 x daily - 7 x weekly - 1 sets - 3 reps - 15 hold  · Child's Pose Stretch - 1 x daily - 7 x weekly - 1 sets - 3 reps - 15 hold  · Seated Mid Back Stretch - 1 x daily - 7 x weekly - 1 sets - 3 reps - 15 hold  · Supine Bridge - 1 x daily - 7 x weekly - 3 sets - 10 reps  · Clamshell with Resistance - 1 x daily - 7 x weekly - 3 sets - 10 reps  · Supine 90/90 Alternating Toe Touch - 1 x daily - 7 x weekly - 3 sets - 10 reps  · Quadruped Hip Abduction and External Rotation - 1 x daily - 7 x weekly - 3 sets - 10 reps

## 2022-10-24 ENCOUNTER — OFFICE VISIT (OUTPATIENT)
Dept: PHYSICAL THERAPY | Facility: CLINIC | Age: 57
End: 2022-10-24
Payer: COMMERCIAL

## 2022-10-24 DIAGNOSIS — M54.16 LUMBAR RADICULOPATHY: Primary | ICD-10-CM

## 2022-10-24 PROCEDURE — 97110 THERAPEUTIC EXERCISES: CPT

## 2022-10-24 PROCEDURE — 97140 MANUAL THERAPY 1/> REGIONS: CPT

## 2022-10-24 NOTE — PROGRESS NOTES
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  He notes he is completing exercise regularly however has not been stretching like he knows he should  Objective: See treatment diary below      Assessment: Tolerated treatment well  Patient would benefit from continued PT  Patient was able to complete stabilization exercise for the L/S with only minor complaints of muscular fatigue/ tightness  He responded well to stretching of the HS/ Piriformis/ hip IR/ER  Patient is able to demonstrate fair muscular flexibility of the BLE this date  Plan: Continue per plan of care        Precautions: CABG x5, hx of 3 MI     Daily Treatment Diary:      Initial Evaluation Date: 10/17/22  Compliance 10/17  10/24                   Visit Number 1 2                   Re-Eval  [facility]BERTO -                [facility]   Foto Captured Y -                          10/17  10/24                   Manual                      Stretch- HS/ Piriformis/ ER/IR   30" x3                   L/S rotational glides/ manual traction   8 min                                         Ther-Ex                      TA setting   10"x 10                   TA - SLR   -                   TA- DKTC c add   x10                   TA- bridge c add   x10                   TA - adduction   10" x10                                         sidely clamshell   Plum TB x20                   sidely hip abd   x20                                Quadruped - rock on all 4's  x10           Cat/Cow  x10           Hip ext  x10                        Prone- opp arm and leg lift  -           Supermans   -                                         Neuro Re-Ed Ther-Act             Patient ed  HEP instruct                                                Modalities

## 2022-10-31 ENCOUNTER — APPOINTMENT (OUTPATIENT)
Dept: PHYSICAL THERAPY | Facility: CLINIC | Age: 57
End: 2022-10-31

## 2023-02-27 ENCOUNTER — OFFICE VISIT (OUTPATIENT)
Dept: PODIATRY | Age: 58
End: 2023-02-27

## 2023-02-27 VITALS — BODY MASS INDEX: 28.56 KG/M2 | HEIGHT: 67 IN | WEIGHT: 182 LBS

## 2023-02-27 DIAGNOSIS — M20.11 HAV (HALLUX ABDUCTO VALGUS), RIGHT: ICD-10-CM

## 2023-02-27 DIAGNOSIS — L84 CALLUS OF FOOT: Primary | ICD-10-CM

## 2023-02-27 RX ORDER — SILDENAFIL CITRATE 20 MG/1
TABLET ORAL
COMMUNITY
Start: 2022-12-06

## 2023-02-27 NOTE — PATIENT INSTRUCTIONS
Wear supportive shoes at all times  Avoid flip-flops, flat sandals, barefoot walking (never walk barefoot, even at home)  Generally avoid shoes that are too flexible and bend in the arch  Your shoes should only slightly bend in the toe area, not the middle  Running sneakers are often the best choice  Supportive sneaker brands: Ras Kim, New Balance, [employer], [employer]  Supportive daily shoe brands: Vionic, Orthofeet Samantha, Dansko, [place], Severo Aguilar, Greg  Supportive home shoes:  Milind Hall (recovery slides)

## 2023-02-27 NOTE — PROGRESS NOTES
Assessment/Plan:     Diagnoses and all orders for this visit:    Callus of foot    Hav (hallux abducto valgus), right    Other orders  -     sildenafil (REVATIO) 20 mg tablet; TAKE FIVE TABLETS BY MOUTH AS DIRECTED  IMPRESSION:  · Right pinch callus (plantar-medial 1st met head) due to HAV     PLAN:  · I reviewed clinical exam and radiographic imaging (XR) with patient in detail today  I have discussed with the patient the pathophysiology of this diagnosis and reviewed how the examination correlates with this diagnosis  Pinch callus likely due to HAV  · Callus trimmed in thickness with a #15 blade  Pain improved  · I discussed supportive and cushioned shoes plus daily cream application to keep skin soft  · F/u 2 weeks to discussed B/L foot pain (lateral, plantar 5th met) - will need B/L WB XRs      Subjective:      Patient ID: Lion Dotson is a 62 y o  male  Dimple Monique presents to clinic today concerning right foot callus  Notes area is thickened and tender  He has trimmed the skin down with a pen knife prior to this  Notes it may be due to his bunion  The following portions of the patient's history were reviewed and updated as appropriate: allergies, current medications, past family history, past medical history, past social history, past surgical history and problem list     Review of Systems   Constitutional: Negative for activity change, chills and fever  HENT: Negative  Respiratory: Negative for cough, chest tightness and shortness of breath  Cardiovascular: Negative for chest pain and leg swelling  Endocrine: Negative  Genitourinary: Negative  Musculoskeletal:        R bunion   Skin:        Right foot callus   Neurological: Negative  Negative for numbness  Psychiatric/Behavioral: Negative  Negative for agitation and behavioral problems           Objective:      Ht 5' 6 5" (1 689 m)   Wt 82 6 kg (182 lb)   BMI 28 94 kg/m²          Physical Exam  Constitutional: General: He is not in acute distress  Appearance: Normal appearance  He is not ill-appearing  Cardiovascular:      Pulses: Normal pulses  Comments: Bilateral DP & PT pulses 2/4  CRT WNL  Pedal hair present  Feet warm and well perfused  Pulmonary:      Effort: No respiratory distress  Musculoskeletal:         General: Deformity (R HAV) present  Comments: Bilateral ankle, STJ, TNJ, TMTJ, MTPJ ROM WNL and without pain  LE muscle strength WNL  Skin:     General: Skin is warm  Capillary Refill: Capillary refill takes less than 2 seconds  Findings: No erythema or lesion  Comments: Right foot plantar-medial 1st met head pinch callus   Neurological:      General: No focal deficit present  Mental Status: He is alert and oriented to person, place, and time  Sensory: No sensory deficit  Comments: Gross sensation intact  Denies N/T/B to B/L feet      Psychiatric:         Mood and Affect: Mood normal          Behavior: Behavior normal